# Patient Record
Sex: MALE | Race: WHITE | NOT HISPANIC OR LATINO | URBAN - METROPOLITAN AREA
[De-identification: names, ages, dates, MRNs, and addresses within clinical notes are randomized per-mention and may not be internally consistent; named-entity substitution may affect disease eponyms.]

---

## 2019-08-16 ENCOUNTER — IMPORTED ENCOUNTER (OUTPATIENT)
Dept: URBAN - METROPOLITAN AREA CLINIC 1 | Facility: CLINIC | Age: 54
End: 2019-08-16

## 2019-08-16 PROBLEM — H02.831: Noted: 2019-08-16

## 2019-08-16 PROBLEM — H02.105: Noted: 2019-08-16

## 2019-08-16 PROBLEM — H25.813: Noted: 2019-08-16

## 2019-08-16 PROBLEM — H02.834: Noted: 2019-08-16

## 2019-08-16 PROCEDURE — 92004 COMPRE OPH EXAM NEW PT 1/>: CPT

## 2019-08-16 NOTE — PATIENT DISCUSSION
Cataract OU -- Observe for now without intervention.  The patient was advised to contact us if any change or worsening of vision

## 2019-08-16 NOTE — PATIENT DISCUSSION
1.  Ectropion LLL likely secondary to [facility] removal of the left lower lid. Recommend the frequent use of ATs TID and gel QHS OS (sample of celluvisc Refresh liquigel and gel tear handout given). Consider referral to Dr. Polly Chavira or Martin Mckeon if symptoms do not resolve. 2. Cataract OU -- Observe for now without intervention. The patient was advised to contact us if any change or worsening of vision3. Dermatochalasis OU UL's  -- Follow with no intervention at this time. 4. S/p [address] OU -- 2005. Doing well. Patient defers MRx today. Return for an appointment in 1 year for a 30 with Dr. Bandar Cantu.

## 2020-08-14 ENCOUNTER — IMPORTED ENCOUNTER (OUTPATIENT)
Dept: URBAN - METROPOLITAN AREA CLINIC 1 | Facility: CLINIC | Age: 55
End: 2020-08-14

## 2020-08-14 PROBLEM — H25.813: Noted: 2020-08-14

## 2020-08-14 PROBLEM — H16.143: Noted: 2020-08-14

## 2020-08-14 PROBLEM — H04.123: Noted: 2020-08-14

## 2020-08-14 PROBLEM — H02.105: Noted: 2020-08-14

## 2020-08-14 PROCEDURE — 92014 COMPRE OPH EXAM EST PT 1/>: CPT

## 2020-08-14 NOTE — PATIENT DISCUSSION
1.  Cataract OU: Observe for now without intervention. The patient was advised to contact us if any change or worsening of vision2. Ectropion LLL likely secondary to [facility] removal of the left lower lid. Consider referral to Dr. Sander Sullivan or [facility] if symptoms do not resolve. 3. CORTES w/ PEK OU- Recommend pt continue AT's BID OU routinely. Begin Xiidra BID OU (erxd)4. Dermatochalasis OU UL's  -- Follow with no intervention at this time. 5. S/p [address] OU -- 2005. Doing well. Patient deferred [facility] Rx today. Return for an appointment in 2 months 10/tear lab with Dr. Joni Schaumann.

## 2020-10-16 ENCOUNTER — IMPORTED ENCOUNTER (OUTPATIENT)
Dept: URBAN - METROPOLITAN AREA CLINIC 1 | Facility: CLINIC | Age: 55
End: 2020-10-16

## 2020-10-16 PROBLEM — H04.123: Noted: 2020-10-16

## 2020-10-16 PROBLEM — H16.143: Noted: 2020-10-16

## 2020-10-16 PROCEDURE — 92012 INTRM OPH EXAM EST PATIENT: CPT

## 2020-10-16 PROCEDURE — 83861 MICROFLUID ANALY TEARS: CPT

## 2020-10-16 NOTE — PATIENT DISCUSSION
1.  CORTES w/ PEK OU -- Cont Xiidra BID OU. Continue AT's BID OU routinely. Tear Lab: [id number] (10.16.2020)2. Cataract OU -- Observe. 3. Ectropion LLL -- Stable. 4.  Dermatochalasis OU UL's -- Follow with no intervention at this time. 5. S/p [address] OU -- 2005. Doing well. Return for an appointment in August 30 with Dr. Daria Coe.

## 2021-08-13 ENCOUNTER — IMPORTED ENCOUNTER (OUTPATIENT)
Dept: URBAN - METROPOLITAN AREA CLINIC 1 | Facility: CLINIC | Age: 56
End: 2021-08-13

## 2021-08-13 PROBLEM — H02.831: Noted: 2021-08-13

## 2021-08-13 PROBLEM — H16.143: Noted: 2021-08-13

## 2021-08-13 PROBLEM — H02.834: Noted: 2021-08-13

## 2021-08-13 PROBLEM — H25.813: Noted: 2021-08-13

## 2021-08-13 PROBLEM — H10.45: Noted: 2021-08-13

## 2021-08-13 PROBLEM — H02.105: Noted: 2021-08-13

## 2021-08-13 PROBLEM — H04.123: Noted: 2021-08-13

## 2021-08-13 PROCEDURE — 92014 COMPRE OPH EXAM EST PT 1/>: CPT

## 2021-08-13 PROCEDURE — 92015 DETERMINE REFRACTIVE STATE: CPT

## 2021-08-13 NOTE — PATIENT DISCUSSION
1.  CORTES w/ PEK OU -- Cont Xiidra BID OU urged compliance pt using QD. Cont ATs TID OU routinely. 2.  Cataract OU -- Non-surgical at this time continue to monitor for progression. The patient was advised to contact us if any change or worsening of vision3. Allergic Conjunctivitis OU -- Recommended OTC Pat[place] QD OU PRN itching (Handout given)4. Dermatochalasis OU UL's -- Non-surgical at this time consider Ptosis VF/Bleph with progression. 5.  Ectropion LLL -- Secondary to [facility] Removal. Consider referral to Dr. Tracy Villagran. 6.  S/p PRK OU (2005)MRx for glasses given to patient. Return for an appointment in 6 months 10/K Check with Dr. Hallie Liz.

## 2022-02-18 ENCOUNTER — FOLLOW UP (OUTPATIENT)
Dept: URBAN - METROPOLITAN AREA CLINIC 1 | Facility: CLINIC | Age: 57
End: 2022-02-18

## 2022-02-18 DIAGNOSIS — H04.123: ICD-10-CM

## 2022-02-18 PROCEDURE — 99213 OFFICE O/P EST LOW 20 MIN: CPT

## 2022-02-18 ASSESSMENT — VISUAL ACUITY
OD_SC: 20/20
OS_SC: 20/20

## 2022-02-18 ASSESSMENT — TONOMETRY
OD_IOP_MMHG: 18
OS_IOP_MMHG: 18

## 2022-04-02 ASSESSMENT — TONOMETRY
OD_IOP_MMHG: 21
OD_IOP_MMHG: 21
OS_IOP_MMHG: 18
OS_IOP_MMHG: 19
OD_IOP_MMHG: 20
OS_IOP_MMHG: 20
OS_IOP_MMHG: 19
OD_IOP_MMHG: 19

## 2022-04-02 ASSESSMENT — VISUAL ACUITY
OD_CC: 20/20
OS_CC: 20/20

## 2022-08-19 ENCOUNTER — COMPREHENSIVE EXAM (OUTPATIENT)
Dept: URBAN - METROPOLITAN AREA CLINIC 1 | Facility: CLINIC | Age: 57
End: 2022-08-19

## 2022-08-19 DIAGNOSIS — H04.123: ICD-10-CM

## 2022-08-19 PROCEDURE — 92012 INTRM OPH EXAM EST PATIENT: CPT

## 2022-08-19 ASSESSMENT — VISUAL ACUITY
OD_SC: 20/20
OS_SC: 20/20

## 2022-08-19 ASSESSMENT — TONOMETRY
OS_IOP_MMHG: 15
OD_IOP_MMHG: 15

## 2023-08-25 ENCOUNTER — COMPREHENSIVE EXAM (OUTPATIENT)
Dept: URBAN - METROPOLITAN AREA CLINIC 1 | Facility: CLINIC | Age: 58
End: 2023-08-25

## 2023-08-25 DIAGNOSIS — H10.13: ICD-10-CM

## 2023-08-25 DIAGNOSIS — H25.813: ICD-10-CM

## 2023-08-25 DIAGNOSIS — H04.123: ICD-10-CM

## 2023-08-25 PROCEDURE — 92014 COMPRE OPH EXAM EST PT 1/>: CPT

## 2023-08-25 ASSESSMENT — VISUAL ACUITY
OD_SC: 20/20
OS_SC: 20/20-2
OS_CC: J1+
OD_CC: J1+

## 2023-08-25 ASSESSMENT — TONOMETRY
OD_IOP_MMHG: 12
OS_IOP_MMHG: 12

## 2024-09-04 ENCOUNTER — COMPREHENSIVE EXAM (OUTPATIENT)
Dept: URBAN - METROPOLITAN AREA CLINIC 1 | Facility: CLINIC | Age: 59
End: 2024-09-04

## 2024-09-04 DIAGNOSIS — H25.813: ICD-10-CM

## 2024-09-04 DIAGNOSIS — H02.834: ICD-10-CM

## 2024-09-04 DIAGNOSIS — Z98.890: ICD-10-CM

## 2024-09-04 DIAGNOSIS — H10.13: ICD-10-CM

## 2024-09-04 DIAGNOSIS — H02.831: ICD-10-CM

## 2024-09-04 DIAGNOSIS — H04.123: ICD-10-CM

## 2024-09-04 DIAGNOSIS — H02.105: ICD-10-CM

## 2024-09-04 PROCEDURE — 92014 COMPRE OPH EXAM EST PT 1/>: CPT

## 2025-03-20 ENCOUNTER — EMERGENCY VISIT (OUTPATIENT)
Age: 60
End: 2025-03-20

## 2025-03-20 DIAGNOSIS — H52.03: ICD-10-CM

## 2025-03-20 DIAGNOSIS — H53.8: ICD-10-CM

## 2025-03-20 PROCEDURE — 92015 DETERMINE REFRACTIVE STATE: CPT

## 2025-03-20 PROCEDURE — 99213 OFFICE O/P EST LOW 20 MIN: CPT
